# Patient Record
Sex: MALE | Race: AMERICAN INDIAN OR ALASKA NATIVE
[De-identification: names, ages, dates, MRNs, and addresses within clinical notes are randomized per-mention and may not be internally consistent; named-entity substitution may affect disease eponyms.]

---

## 2019-12-15 ENCOUNTER — HOSPITAL ENCOUNTER (EMERGENCY)
Dept: HOSPITAL 5 - ED | Age: 24
LOS: 1 days | Discharge: HOME | End: 2019-12-16
Payer: SELF-PAY

## 2019-12-15 DIAGNOSIS — J20.9: Primary | ICD-10-CM

## 2019-12-15 PROCEDURE — 71046 X-RAY EXAM CHEST 2 VIEWS: CPT

## 2019-12-15 NOTE — EMERGENCY DEPARTMENT REPORT
- General


Chief Complaint: Upper Respiratory Infection


Stated Complaint: SPITTING UP BLOOD/FEVER


Time Seen by Provider: 12/15/19 22:23


Source: patient


Mode of arrival: Ambulatory


Limitations: No Limitations





- History of Present Illness


Initial Comments: 





Patient is a 24-year-old male presents emergency room with complaints of URI 

symptoms that began a week ago.  He has associated productive cough, fever, 

headache, rhinorrhea, congestion.  He states that yesterday after frequent 

coughing he had one episode of blood tinged sputum.  He denies any sore throat, 

ear pain, chest pain, shortness of breath.  He denies any past medical history 

or allergies medications.  He endorses tobacco and marijuana use.





- Related Data


                                  Previous Rx's











 Medication  Instructions  Recorded  Last Taken  Type


 


Azithromycin [Zithromax TAB] 250 mg PO QDAY 5 Days #6 tablet 12/15/19 Unknown Rx


 


predniSONE [Deltasone] 40 mg PO QDAY 5 Days #10 tab 12/15/19 Unknown Rx











                                    Allergies











Allergy/AdvReac Type Severity Reaction Status Date / Time


 


No Known Allergies Allergy   Unverified 12/15/19 20:41














ED Review of Systems


ROS: 


Stated complaint: SPITTING UP BLOOD/FEVER


Other details as noted in HPI





Comment: All other systems reviewed and negative





ED Past Medical Hx





- Past Medical History


Previous Medical History?: No





- Surgical History


Past Surgical History?: No





- Social History


Smoking Status: Never Smoker


Substance Use Type: None





- Medications


Home Medications: 


                                Home Medications











 Medication  Instructions  Recorded  Confirmed  Last Taken  Type


 


Azithromycin [Zithromax TAB] 250 mg PO QDAY 5 Days #6 tablet 12/15/19  Unknown 

Rx


 


predniSONE [Deltasone] 40 mg PO QDAY 5 Days #10 tab 12/15/19  Unknown Rx














ED Physical Exam





- General


Limitations: No Limitations


General appearance: alert, in no apparent distress





- Head


Head exam: Present: atraumatic, normocephalic





- Eye


Eye exam: Present: normal appearance





- ENT


ENT exam: Present: mucous membranes moist





- Respiratory


Respiratory exam: Present: normal lung sounds bilaterally.  Absent: respiratory 

distress, wheezes, rales, rhonchi, stridor, chest wall tenderness, accessory 

muscle use, decreased breath sounds, prolonged expiratory





- Cardiovascular


Cardiovascular Exam: Present: regular rate, normal rhythm, normal heart sounds. 

Absent: systolic murmur, diastolic murmur, rubs, gallop





- Neurological Exam


Neurological exam: Present: alert, oriented X3





- Psychiatric


Psychiatric exam: Present: normal affect, normal mood





- Skin


Skin exam: Present: warm, dry, intact





ED Course


                                   Vital Signs











  12/15/19 12/15/19 12/16/19





  20:34 20:40 00:10


 


Temperature 100.1 F H 100.0 F H 100.1 F H


 


Pulse Rate 101 H 103 H 107 H


 


Respiratory 18 18 18





Rate   


 


Blood Pressure 115/75 115/75 116/71


 


O2 Sat by Pulse 98 98 95





Oximetry   














  12/16/19





  00:14


 


Temperature 


 


Pulse Rate 


 


Respiratory 18





Rate 


 


Blood Pressure 


 


O2 Sat by Pulse 100





Oximetry 














ED Medical Decision Making





- Radiology Data


Radiology results: report reviewed





CHEST 2 VIEWS





INDICATION / CLINICAL INFORMATION:


cough, fever x1 week.





COMPARISON:


None available.





FINDINGS:





SUPPORT DEVICES: None.


HEART / MEDIASTINUM: No significant abnormality.


LUNGS / PLEURA: No significant pulmonary or pleural abnormality. No 

pneumothorax.





ADDITIONAL FINDINGS: No significant additional findings.





IMPRESSION:


1. No acute findings.





Signer Name: Anoop Wynn MD


Signed: 12/15/2019 11:09 PM


Workstation Name: [id number]








Transcribed By: BRUNA


Dictated By: Anoop Wynn MD


Electronically Authenticated By: Anoop Wynn MD


Signed Date/Time: 12/15/19 8506








- Medical Decision Making





Patient is a 24-year-old male presents emergency room with complaints of URI 

symptoms that began a week ago.  He has associated productive cough, fever, 

headache, rhinorrhea, congestion.  He states that yesterday after frequent 

coughing he had one episode of blood tinged sputum.  He denies any sore throat, 

ear pain, chest pain, shortness of breath.  He denies any past medical history 

or allergies medications.  He endorses tobacco and marijuana use.  Vitals with 

low-grade temp and mild tachycardia.  Chest x-ray with no acute findings. pt 

will be treated for acute bronchitis, given prescription for azithromycin and 

prednisone. advised pt to Please take medication as prescribed.  May take Robitu

ssin over-the-counter for coughing.  May take Tylenol or ibuprofen for any 

discomfort.  May use a humidifier.  Drink warm soup broth, drink warm tea.  

Follow-up with a primary care doctor in the next 2-3 days.  Return to the 

emergency room for any new or worsening symptoms.





- Differential Diagnosis


PNA, URI, bronchitis, viral syndrome


Critical care attestation.: 


If time is entered above; I have spent that time in minutes in the direct care 

of this critically ill patient, excluding procedure time.








ED Disposition


Clinical Impression: 


Acute bronchitis


Qualifiers:


 Bronchitis organism: unspecified organism Qualified Code(s): J20.9 - Acute 

bronchitis, unspecified





Disposition: DC-01 TO HOME OR SELFCARE


Is pt being admited?: No


Does the pt Need Aspirin: No


Condition: Stable


Instructions:  Acute Bronchitis (ED)


Additional Instructions: 


Please take medication as prescribed.  May take Robitussin over-the-counter for 

coughing.  May take Tylenol or ibuprofen for any discomfort.  May use a 

humidifier.  Drink warm soup broth, drink warm tea.  Follow-up with a primary 

care doctor in the next 2-3 days.  Return to the emergency room for any new or 

worsening symptoms.


Prescriptions: 


predniSONE [Deltasone] 40 mg PO QDAY 5 Days #10 tab


Azithromycin [Zithromax TAB] 250 mg PO QDAY 5 Days #6 tablet


Referrals: 


PEACE JHA MD [Staff Physician] - 2-3 Days


[facility] [Outside] - 2-3 Days


Time of Disposition: 23:38


Print Language: ENGLISH

## 2019-12-15 NOTE — XRAY REPORT
CHEST 2 VIEWS 



INDICATION / CLINICAL INFORMATION:

cough, fever x1 week. 



COMPARISON: 

None available.



FINDINGS:



SUPPORT DEVICES: None.

HEART / MEDIASTINUM: No significant abnormality. 

LUNGS / PLEURA: No significant pulmonary or pleural abnormality. No pneumothorax. 



ADDITIONAL FINDINGS: No significant additional findings.



IMPRESSION:

1. No acute findings. 



Signer Name: Anoop Wynn MD 

Signed: 12/15/2019 11:09 PM

 Workstation Name: [id number]

## 2019-12-16 VITALS — DIASTOLIC BLOOD PRESSURE: 71 MMHG | SYSTOLIC BLOOD PRESSURE: 116 MMHG
